# Patient Record
Sex: MALE | Race: WHITE | NOT HISPANIC OR LATINO | URBAN - METROPOLITAN AREA
[De-identification: names, ages, dates, MRNs, and addresses within clinical notes are randomized per-mention and may not be internally consistent; named-entity substitution may affect disease eponyms.]

---

## 2019-11-13 ENCOUNTER — OUTPATIENT (OUTPATIENT)
Dept: OUTPATIENT SERVICES | Facility: HOSPITAL | Age: 49
LOS: 1 days | Discharge: HOME | End: 2019-11-13

## 2019-11-13 DIAGNOSIS — E83.119 HEMOCHROMATOSIS, UNSPECIFIED: ICD-10-CM

## 2019-11-13 DIAGNOSIS — N28.9 DISORDER OF KIDNEY AND URETER, UNSPECIFIED: ICD-10-CM

## 2019-11-13 DIAGNOSIS — K76.0 FATTY (CHANGE OF) LIVER, NOT ELSEWHERE CLASSIFIED: ICD-10-CM

## 2019-11-13 DIAGNOSIS — D50.9 IRON DEFICIENCY ANEMIA, UNSPECIFIED: ICD-10-CM

## 2019-11-27 ENCOUNTER — OUTPATIENT (OUTPATIENT)
Dept: OUTPATIENT SERVICES | Facility: HOSPITAL | Age: 49
LOS: 1 days | Discharge: HOME | End: 2019-11-27
Payer: COMMERCIAL

## 2019-11-27 DIAGNOSIS — Z01.818 ENCOUNTER FOR OTHER PREPROCEDURAL EXAMINATION: ICD-10-CM

## 2019-11-27 PROCEDURE — 93010 ELECTROCARDIOGRAM REPORT: CPT

## 2019-12-12 ENCOUNTER — OUTPATIENT (OUTPATIENT)
Dept: OUTPATIENT SERVICES | Facility: HOSPITAL | Age: 49
LOS: 1 days | Discharge: HOME | End: 2019-12-12
Payer: COMMERCIAL

## 2019-12-12 DIAGNOSIS — K76.0 FATTY (CHANGE OF) LIVER, NOT ELSEWHERE CLASSIFIED: ICD-10-CM

## 2019-12-12 DIAGNOSIS — E78.00 PURE HYPERCHOLESTEROLEMIA, UNSPECIFIED: ICD-10-CM

## 2019-12-12 DIAGNOSIS — G47.33 OBSTRUCTIVE SLEEP APNEA (ADULT) (PEDIATRIC): ICD-10-CM

## 2019-12-12 PROCEDURE — 76942 ECHO GUIDE FOR BIOPSY: CPT | Mod: 26

## 2019-12-12 PROCEDURE — 88307 TISSUE EXAM BY PATHOLOGIST: CPT | Mod: 26

## 2019-12-12 PROCEDURE — 99152 MOD SED SAME PHYS/QHP 5/>YRS: CPT

## 2019-12-12 PROCEDURE — 88313 SPECIAL STAINS GROUP 2: CPT | Mod: 26

## 2019-12-12 PROCEDURE — 47000 NEEDLE BIOPSY OF LIVER PERQ: CPT

## 2019-12-12 NOTE — PROGRESS NOTE ADULT - SUBJECTIVE AND OBJECTIVE BOX
PREOPERATIVE DAY OF PROCEDURE EVALUATION:     I have personally seen and examined this patient. I agree with the history and physical which I have reviewed and noted any changes below:     Plan is for percutaneous, image-guided core needle liver biopsy with intravenous conscious sedation    Procedure/ risks/ benefits/ goals/ alternatives were explained. All questions answered. Informed content obtained from patient. Consent placed in chart.

## 2019-12-12 NOTE — PROGRESS NOTE ADULT - SUBJECTIVE AND OBJECTIVE BOX
INTERVENTIONAL RADIOLOGY BRIEF-OPERATIVE NOTE    Procedure:  Percutaneous, image-guided core needle liver biopsy with intravenous conscious sedation    Pre-Op Diagnosis:  Fatty liver; elevated iron    Post-Op Diagnosis:  Same    Attending:   Dr. López  Resident:   None    Anesthesia (type):  [ ] General Anesthesia  [X] Sedation-- Versed, 3mg and Fentanyl, 75mcg, iv  [ ] Spinal Anesthesia  [X] Local/Regional-- 1% Lidocaine, 9cc SQ, RUQ    Contrast:   None    Estimated Blood Loss:  3cc    Condition:   [ ] Critical  [ ] Serious  [ ] Fair   [X] Good    Findings/Follow up Plan of Care:  Right lobe of the liver biopsied using 17/18G, 4.6/10cm Corvocet coaxial needle system with ultrasound guidance.  Three 18G cores obtained and submitted for pathology.  Post-biopsy ultrasound shows no new subcapsular or vick-hepatic fluid collection.  Patient tolerated very well, without incident.    Specimens Removed:  18G cores x2 to iron-free container; 18G core x1 to formalin container.  All specimens sent for pathology/cytopathology    Implants:  None    Complications:  None immediate    Disposition:  Anticipated D/C home today and f/u Dr. Baptiste      Please call Interventional Radiology [phone] with any questions, concerns, or issues.

## 2019-12-18 LAB — RF GUIDANCE FOR PERCUTANEOUS BIOPSY OF LIVER: ABNORMAL

## 2019-12-20 LAB — SURGICAL PATHOLOGY STUDY: SIGNIFICANT CHANGE UP

## 2023-05-12 ENCOUNTER — APPOINTMENT (OUTPATIENT)
Dept: ORTHOPEDIC SURGERY | Facility: CLINIC | Age: 53
End: 2023-05-12
Payer: COMMERCIAL

## 2023-05-12 VITALS — HEIGHT: 66 IN | BODY MASS INDEX: 28.93 KG/M2 | WEIGHT: 180 LBS

## 2023-05-12 DIAGNOSIS — Z87.19 PERSONAL HISTORY OF OTHER DISEASES OF THE DIGESTIVE SYSTEM: ICD-10-CM

## 2023-05-12 DIAGNOSIS — M54.16 RADICULOPATHY, LUMBAR REGION: ICD-10-CM

## 2023-05-12 DIAGNOSIS — Z78.9 OTHER SPECIFIED HEALTH STATUS: ICD-10-CM

## 2023-05-12 PROBLEM — Z00.00 ENCOUNTER FOR PREVENTIVE HEALTH EXAMINATION: Status: ACTIVE | Noted: 2023-05-12

## 2023-05-12 PROCEDURE — 72100 X-RAY EXAM L-S SPINE 2/3 VWS: CPT

## 2023-05-12 PROCEDURE — 99203 OFFICE O/P NEW LOW 30 MIN: CPT

## 2023-05-12 RX ORDER — MELOXICAM 15 MG/1
15 TABLET ORAL DAILY
Qty: 30 | Refills: 1 | Status: ACTIVE | COMMUNITY
Start: 2023-05-12 | End: 1900-01-01

## 2023-05-12 RX ORDER — CYCLOBENZAPRINE HYDROCHLORIDE 10 MG/1
10 TABLET, FILM COATED ORAL
Qty: 30 | Refills: 0 | Status: ACTIVE | COMMUNITY
Start: 2023-05-12 | End: 1900-01-01

## 2023-05-12 NOTE — DATA REVIEWED
[Lumbar Spine] : lumbar spine [I independently reviewed and interpreted images and report] : I independently reviewed and interpreted images and report [FreeTextEntry1] : Of the lumbar spine showed no acute fracture, subluxation or dislocations.  However patient did have some narrowing noted between the x-ray showed no acute fracture, subluxation or dislocation.  Did note some narrowing between the L4 and L5 vertebrae

## 2023-05-12 NOTE — HISTORY OF PRESENT ILLNESS
[de-identified] : Patient is a 52-year-old male, retired  coming in today for evaluation of right-sided low back pain with radicular symptoms into the right lower leg, hip and buttocks.  Patient states that approximately 2 weeks ago on 28 April 2023 he was doing dead lifts with 125 pounds when he felt sharp, sudden onset pain in his right lower back.  The pain got progressively worse over the next few hours and within 24 hours he was in such severe pain that he was unable to stand up straight.  He has been treating this conservatively with rest, stretching, heat and OTC NSAIDs with minimal response.  Due to persistent pain he comes in today for initial orthopedic evaluation.

## 2023-05-12 NOTE — ASSESSMENT
[FreeTextEntry1] : Discussed with patient concern for acute, traumatic herniated disc.  At this point due to the radicular symptoms into his lower leg I am concerned that he has a herniated or bulging disc that is causing compression on the L4 nerve.  Patient lives in [place] so were unable to schedule him follow-up with this office however he was given a prescription for an MRI to get completed to soon as possible.  He will follow-up with a orthopedic spine specialist or neurologist in [place] once he gets the MRI results.\par He was given a prescription for meloxicam 15 mg daily.  Risk, benefits and alternatives to meloxicam were discussed.  He was given a prescription for Flexeril 10 mg nightly.  Risk, benefits and alternatives to Flexeril discussed.  He expressed understanding of outlined care plan and will follow-up in [place].

## 2023-05-12 NOTE — PHYSICAL EXAM
[Flexion] : flexion [Extension] : extension [Bending to left] : bending to left [Bending to right] : bending to right [] : non-antalgic [TWNoteComboBox7] : forward flexion 75 degrees [de-identified] : extension 20 degrees [de-identified] : left lateral bending 20 degrees [de-identified] : left lateral rotation 30 degrees

## 2023-07-16 ENCOUNTER — RX RENEWAL (OUTPATIENT)
Age: 53
End: 2023-07-16